# Patient Record
Sex: MALE | Race: WHITE
[De-identification: names, ages, dates, MRNs, and addresses within clinical notes are randomized per-mention and may not be internally consistent; named-entity substitution may affect disease eponyms.]

---

## 2017-07-04 ENCOUNTER — HOSPITAL ENCOUNTER (EMERGENCY)
Dept: HOSPITAL 25 - UCCORT | Age: 52
Discharge: HOME | End: 2017-07-04
Payer: COMMERCIAL

## 2017-07-04 VITALS — SYSTOLIC BLOOD PRESSURE: 115 MMHG | DIASTOLIC BLOOD PRESSURE: 62 MMHG
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
## 2017-07-04 DIAGNOSIS — S51.811A: Primary | ICD-10-CM

## 2017-07-04 PROCEDURE — 90715 TDAP VACCINE 7 YRS/> IM: CPT

## 2017-07-04 PROCEDURE — 12002 RPR S/N/AX/GEN/TRNK2.6-7.5CM: CPT

## 2017-07-04 PROCEDURE — G0463 HOSPITAL OUTPT CLINIC VISIT: HCPCS

## 2017-07-04 PROCEDURE — 90471 IMMUNIZATION ADMIN: CPT

## 2017-07-04 PROCEDURE — 99212 OFFICE O/P EST SF 10 MIN: CPT

## 2017-07-04 NOTE — ED
Laceration/Wound HPI





- HPI Summary


HPI Summary: 





51 YEAR OLD WITH LACERATION ON RIGHT FOREARM. 





- History of Current Complaint


Stated Complaint: RIGHT FORE ARM LACERATION


Time Seen by Provider: 07/04/17 14:07





- Allergy/Home Medications


Allergies/Adverse Reactions: 


 Allergies











Allergy/AdvReac Type Severity Reaction Status Date / Time


 


Ciprofloxacin [From Cipro] Allergy Intermediate Muscle Ache Verified 07/04/17 14

:15














PMH/Surg Hx/FS Hx/Imm Hx


Endocrine/Hematology History: 


   Denies: Hx Diabetes


Cardiovascular History: 


   Denies: Hx Hypertension


Respiratory History: 


   Denies: Hx Asthma





- Surgical History


Surgery Procedure, Year, and Place: fistula repair.  inguinal hernia repair/

testicle removed as an infant





- Social History


Alcohol Use: Daily


Alcohol Amount: glass a wine


Substance Use Type: Reports: None


Smoking Status (MU): Never Smoked Tobacco





Review of Systems


Positive: Other - RIGHT FOREARM LACERATION


All Other Systems Reviewed And Are Negative: Yes





Physical Exam


Triage Information Reviewed: Yes


Vital Signs Reviewed: Yes


Appearance: Positive: Signs of Trauma


Skin: Positive: Tender





Procedures





- Laceration/Wound Repair


  ** 1


Location: upper extremity - RIGHT FOREARM


Description: Linear


Anesthesia: Local, 1.0%, Lido


Length, Depth and Shape: 3 CM, .5 CM, LINEAR


Betadine Prep?: Yes - HIBICLENS THEN 3 COATS OF BETADINE


Laceration/Wound Explored: clean, no foreign body removed


Closure: Single Layer


Suture Type: Nylon


Number of Sutures: 4


Layer Closure?: Yes - 4 INTERRUPTED SUTURES


Sterile Dressing Applied?: Yes





Laceration Repair Course/Dx





- Differential Dx


Differental Diagnoses: Laceration





- Clinical Impression


Provider Diagnoses: 


 Laceration of forearm








Discharge





- Discharge Plan


Condition: Stable


Disposition: HOME


Prescriptions: 


Sulfamethox/Trimethoprim DS* [Bactrim /160 TAB*] 1 tab PO BID #14 tab


Patient Education Materials:  Laceration (ED)


Referrals: 


Gerhard Montenegro MD [Primary Care Provider] -

## 2018-05-29 ENCOUNTER — HOSPITAL ENCOUNTER (EMERGENCY)
Dept: HOSPITAL 25 - UCCORT | Age: 53
Discharge: HOME | End: 2018-05-29
Payer: COMMERCIAL

## 2018-05-29 VITALS — DIASTOLIC BLOOD PRESSURE: 87 MMHG | SYSTOLIC BLOOD PRESSURE: 160 MMHG

## 2018-05-29 DIAGNOSIS — M79.645: Primary | ICD-10-CM

## 2018-05-29 DIAGNOSIS — Z88.3: ICD-10-CM

## 2018-05-29 PROCEDURE — G0463 HOSPITAL OUTPT CLINIC VISIT: HCPCS

## 2018-05-29 PROCEDURE — 99211 OFF/OP EST MAY X REQ PHY/QHP: CPT

## 2018-05-29 NOTE — UC
Hand/Wrist HPI





- HPI Summary


HPI Summary: 





left ring finger pain and swelling, cannot remove his ring form the finger due 

to swelling 


no known injury , has been doing lots of heaving work using his hands








- History Of Current Complaint


Chief Complaint: UCGeneralIllness


Stated Complaint: LEFT RING FINGER COMPLAINT


Time Seen by Provider: 05/29/18 09:18


Hx Obtained From: Patient


Mechanism Of Injury: no injury , ring stock in left ring finger


Onset/Duration: Gradual Onset, Lasting Days - 2, Still Present


Severity Initially: Moderate


Severity Currently: Moderate


Pain Intensity: 0


Pain Scale Used: 0-10 Numeric


Character Of Pain: Aching - left ring finger


Aggravating Factor(s): Movement


Alleviating Factor(s): Nothing


Associated Signs And Symptoms: Positive: Swelling - left ring finger, Other





- Allergies/Home Medications


Allergies/Adverse Reactions: 


 Allergies











Allergy/AdvReac Type Severity Reaction Status Date / Time


 


ciprofloxacin [From Cipro] Allergy  Muscle Ache Verified 05/29/18 09:10











Home Medications: 


 Home Medications





Ibuprofen TAB* [Advil TAB*] 200 mg PO Q6H PRN 05/29/18 [History Confirmed 05/29/ 18]











PMH/Surg Hx/FS Hx/Imm Hx


Previously Healthy: Yes





- Surgical History


Surgical History: Yes


Surgery Procedure, Year, and Place: fistula repair.  inguinal hernia repair/

testicle removed as an infant





- Family History


Known Family History: 


   Negative: Diabetes





- Social History


Alcohol Use: Daily


Alcohol Amount: glass a wine


Substance Use Type: None


Smoking Status (MU): Never Smoked Tobacco





- Immunization History


Most Recent Influenza Vaccination: [id number]





Review of Systems


Constitutional: Negative


Eyes: Negative


ENT: Negative


Respiratory: Negative


Cardiovascular: Negative


Is Patient Immunocompromised?: No


All Other Systems Reviewed And Are Negative: Yes





Physical Exam


Triage Information Reviewed: Yes


Appearance: Well-Appearing, No Pain Distress, Well-Nourished


Vital Signs: 


 Initial Vital Signs











Temp  98.3 F   05/29/18 09:12


 


Pulse  64   05/29/18 09:12


 


Resp  18   05/29/18 09:12


 


BP  160/87   05/29/18 09:12


 


Pulse Ox  100   05/29/18 09:12











Eye Exam: Normal


ENT: Positive: Normal ENT inspection, Hearing grossly normal, Pharynx normal


Neck: Positive: Supple, Nontender


Respiratory: Positive: Chest non-tender, Lungs clear, Normal breath sounds


Cardiovascular: Positive: RRR, No Murmur, Pulses Normal


Musculoskeletal: Positive: Edema @ - left ring finger


Skin: Positive: Other - wedding ring stock on his left ring finger ring was 

removed form the finger using a sting and lubrication





Hand/Wrist Course/Dx





- Differential Dx/Diagnosis


Provider Diagnoses: left ring finger pain.  ring stuck on finger





Discharge





- Sign-Out/Discharge


Documenting (check all that apply): Discharge/Admit/Transfer





- Discharge Plan


Condition: Stable


Disposition: HOME


Patient Education Materials:  Swollen Joint (ED)


Referrals: 


Gerhard Montenegro MD [Primary Care Provider] - If Needed


Additional Instructions: 


use ice for 15 min 3 x per day for the swelling , may take ibuprofen as needed 

for pain 


follow up as needed








- Billing Disposition and Condition


Condition: STABLE


Disposition: HOME